# Patient Record
Sex: MALE | Race: OTHER | Employment: OTHER | URBAN - METROPOLITAN AREA
[De-identification: names, ages, dates, MRNs, and addresses within clinical notes are randomized per-mention and may not be internally consistent; named-entity substitution may affect disease eponyms.]

---

## 2019-08-20 NOTE — PATIENT DISCUSSION
INCREASED AT 4 MONTHS - BUT [facility] 20/30 - PT WANTS TO WAIT 3 MORE MONTHS - OS IS HER GOOD EYE - IMAGES ARE DISTORTED OD - THEREFORE TO WAIT.

## 2019-10-29 NOTE — PATIENT DISCUSSION
FINA 5 7 19 - INCREASING VMT WITHOUT SIG ERM AND [facility]

## 2019-12-12 NOTE — PATIENT DISCUSSION
Recommend CATARACT SURGERY PRIOR to MACULAR HOLE repair, PREFERABLY with ONCE PIECE ACRYLIC LENS. Patient understands it is likely a vitrectomy will be needed after the cataract surgery to further improve their vision. They understand the cataract surgery will improve the view of the MACULAR HOLE during the surgery, and will improve the view and ability to treat MACULAR HOLE and any retinal problems after the surgery. Pt instructed to RETURN 3 TO 4 WEEKS AFTER CATARACT SURGERY for reevaluation.

## 2020-03-05 NOTE — PATIENT DISCUSSION
FINA 5 7 19 - INCREASING VMT WITHOUT SIG ERM AND [facility]UCTION [facility]

## 2020-06-27 NOTE — PATIENT DISCUSSION
FINA 5 7 19 - INCREASING VMT WITHOUT SIG ERM [facility], unchanged

## 2021-07-28 NOTE — PATIENT DISCUSSION
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

## 2021-11-03 ENCOUNTER — CATARACT EVALUATION (OUTPATIENT)
Dept: URBAN - METROPOLITAN AREA CLINIC 39 | Facility: CLINIC | Age: 56
End: 2021-11-03

## 2021-11-03 DIAGNOSIS — H25.811: ICD-10-CM

## 2021-11-03 DIAGNOSIS — Z98.890: ICD-10-CM

## 2021-11-03 DIAGNOSIS — H04.123: ICD-10-CM

## 2021-11-03 DIAGNOSIS — H25.812: ICD-10-CM

## 2021-11-03 DIAGNOSIS — H43.812: ICD-10-CM

## 2021-11-03 PROCEDURE — 92015 DETERMINE REFRACTIVE STATE: CPT

## 2021-11-03 PROCEDURE — 92286 ANT SGM IMG I&R SPECLR MIC: CPT

## 2021-11-03 PROCEDURE — 99204 OFFICE O/P NEW MOD 45 MIN: CPT

## 2021-11-03 PROCEDURE — 92250 FUNDUS PHOTOGRAPHY W/I&R: CPT

## 2021-11-03 PROCEDURE — 92025AV CORNEAL TOPOGRAPHY, AV

## 2021-11-03 PROCEDURE — 92134 CPTRZ OPH DX IMG PST SGM RTA: CPT

## 2021-11-03 PROCEDURE — 92136TC INTERFEROMETRY - TECHNICAL COMPONENT

## 2021-11-03 PROCEDURE — V2799PMN IMPRIMIS PRED-MOXI-NEPAF 5ML

## 2021-11-03 RX ORDER — AMOXICILLIN 500 MG/1: 1 CAPSULE ORAL

## 2021-11-03 ASSESSMENT — TONOMETRY
OD_IOP_MMHG: 13
OS_IOP_MMHG: 12

## 2021-11-03 ASSESSMENT — VISUAL ACUITY
OD_SC: J3-
OS_SC: J1
OD_CC: 20/25+2
OD_SC: 20/30-2
OS_BAT: 20/50
OS_CC: 20/20
OS_SC: 20/40
OD_BAT: 20/60

## 2021-11-08 NOTE — PATIENT DISCUSSION
HUIA 5 7 19 - INCREASING VMT WITHOUT SIG ERM AND REDUCTION IN [facility].

## 2021-11-11 ENCOUNTER — ESTABLISHED PATIENT (OUTPATIENT)
Dept: URBAN - METROPOLITAN AREA SURGERY 14 | Facility: SURGERY | Age: 56
End: 2021-11-11

## 2021-11-11 ENCOUNTER — SURGERY/PROCEDURE (OUTPATIENT)
Dept: URBAN - METROPOLITAN AREA CLINIC 39 | Facility: CLINIC | Age: 56
End: 2021-11-11

## 2021-11-11 DIAGNOSIS — H25.812: ICD-10-CM

## 2021-11-11 DIAGNOSIS — Z98.890: ICD-10-CM

## 2021-11-11 DIAGNOSIS — H43.812: ICD-10-CM

## 2021-11-11 DIAGNOSIS — H04.123: ICD-10-CM

## 2021-11-11 DIAGNOSIS — H25.811: ICD-10-CM

## 2021-11-11 PROCEDURE — 99211HP H&P OFFICE/OUTPATIENT VISIT, EST

## 2021-11-11 PROCEDURE — 66984AV REMOVE CATARACT, INSERT ADVANCED LENS

## 2021-11-11 PROCEDURE — 66999LNSR LENSAR LASER FOR CAT SX

## 2021-11-12 ENCOUNTER — CATARACT POST-OP 1-DAY (OUTPATIENT)
Dept: URBAN - METROPOLITAN AREA CLINIC 39 | Facility: CLINIC | Age: 56
End: 2021-11-12

## 2021-11-12 DIAGNOSIS — Z96.1: ICD-10-CM

## 2021-11-12 PROCEDURE — 99024 POSTOP FOLLOW-UP VISIT: CPT

## 2021-11-12 ASSESSMENT — VISUAL ACUITY
OS_SC: 20/40+1
OS_SC: J1

## 2021-11-12 ASSESSMENT — TONOMETRY
OD_IOP_MMHG: 15
OS_IOP_MMHG: 16

## 2021-11-16 ENCOUNTER — POST OP/EVAL OF SECOND EYE (OUTPATIENT)
Dept: URBAN - METROPOLITAN AREA CLINIC 39 | Facility: CLINIC | Age: 56
End: 2021-11-16

## 2021-11-16 DIAGNOSIS — Z96.1: ICD-10-CM

## 2021-11-16 DIAGNOSIS — H25.811: ICD-10-CM

## 2021-11-16 PROCEDURE — 99024 POSTOP FOLLOW-UP VISIT: CPT

## 2021-11-16 PROCEDURE — 92012 INTRM OPH EXAM EST PATIENT: CPT

## 2021-11-16 ASSESSMENT — TONOMETRY
OD_IOP_MMHG: 15
OS_IOP_MMHG: 15

## 2021-11-16 ASSESSMENT — VISUAL ACUITY
OD_BAT: 20/60
OS_SC: 20/30-2
OS_SC: J1

## 2021-11-18 ENCOUNTER — ESTABLISHED PATIENT (OUTPATIENT)
Dept: URBAN - METROPOLITAN AREA SURGERY 14 | Facility: SURGERY | Age: 56
End: 2021-11-18

## 2021-11-18 ENCOUNTER — FOLLOW UP (OUTPATIENT)
Dept: URBAN - METROPOLITAN AREA CLINIC 39 | Facility: CLINIC | Age: 56
End: 2021-11-18

## 2021-11-18 ENCOUNTER — SURGERY/PROCEDURE (OUTPATIENT)
Dept: URBAN - METROPOLITAN AREA CLINIC 39 | Facility: CLINIC | Age: 56
End: 2021-11-18

## 2021-11-18 DIAGNOSIS — H25.811: ICD-10-CM

## 2021-11-18 DIAGNOSIS — Z96.1: ICD-10-CM

## 2021-11-18 PROCEDURE — 66999LNSR LENSAR LASER FOR CAT SX

## 2021-11-18 PROCEDURE — 99211T TECH SERVICE

## 2021-11-18 PROCEDURE — 66984AV REMOVE CATARACT, INSERT ADVANCED LENS

## 2021-11-18 PROCEDURE — 99211HP H&P OFFICE/OUTPATIENT VISIT, EST

## 2021-11-18 ASSESSMENT — VISUAL ACUITY
OD_SC: 20/40
OS_SC: J1
OS_SC: 20/40-1

## 2021-11-19 ENCOUNTER — CATARACT POST-OP 1-DAY (OUTPATIENT)
Dept: URBAN - METROPOLITAN AREA CLINIC 39 | Facility: CLINIC | Age: 56
End: 2021-11-19

## 2021-11-19 DIAGNOSIS — Z96.1: ICD-10-CM

## 2021-11-19 PROCEDURE — 99024 POSTOP FOLLOW-UP VISIT: CPT

## 2021-11-19 ASSESSMENT — VISUAL ACUITY
OS_PH: 20/20-2
OS_SC: 20/30-1
OD_SC: J1
OS_SC: J1+
OD_SC: 20/20-2

## 2021-11-19 ASSESSMENT — TONOMETRY
OD_IOP_MMHG: 19
OS_IOP_MMHG: 19

## 2021-12-17 ENCOUNTER — POST-OP (OUTPATIENT)
Dept: URBAN - METROPOLITAN AREA CLINIC 39 | Facility: CLINIC | Age: 56
End: 2021-12-17

## 2021-12-17 DIAGNOSIS — Z96.1: ICD-10-CM

## 2021-12-17 PROCEDURE — 92310AV CL MGMNT ADVANCED VISION TRIAL ALL INCL

## 2021-12-17 PROCEDURE — 99024 POSTOP FOLLOW-UP VISIT: CPT

## 2021-12-17 ASSESSMENT — VISUAL ACUITY
OS_PH: 20/25
OS_SC: J1+
OD_SC: 20/20
OS_SC: 20/40
OD_SC: J1+

## 2021-12-17 ASSESSMENT — TONOMETRY
OS_IOP_MMHG: 12
OD_IOP_MMHG: 12

## 2022-01-14 ENCOUNTER — POST-OP (OUTPATIENT)
Dept: URBAN - METROPOLITAN AREA CLINIC 39 | Facility: CLINIC | Age: 57
End: 2022-01-14

## 2022-01-14 DIAGNOSIS — Z96.1: ICD-10-CM

## 2022-01-14 PROCEDURE — 99024 POSTOP FOLLOW-UP VISIT: CPT

## 2022-01-14 ASSESSMENT — VISUAL ACUITY
OS_BAT: 20/40 W/MR
OS_SC: J1+
OS_SC: 20/40+2
OD_SC: J1+
OS_PH: 20/20-1
OD_BAT: 20/40 W/MR
OD_SC: 20/20

## 2022-01-14 ASSESSMENT — TONOMETRY
OS_IOP_MMHG: 12
OD_IOP_MMHG: 12

## 2022-01-17 ENCOUNTER — SURGERY/PROCEDURE (OUTPATIENT)
Dept: URBAN - METROPOLITAN AREA SURGERY 14 | Facility: SURGERY | Age: 57
End: 2022-01-17

## 2022-01-17 ENCOUNTER — CONSULTATION/EVALUATION (OUTPATIENT)
Dept: URBAN - METROPOLITAN AREA CLINIC 39 | Facility: CLINIC | Age: 57
End: 2022-01-17

## 2022-01-17 DIAGNOSIS — H26.492: ICD-10-CM

## 2022-01-17 DIAGNOSIS — Z96.1: ICD-10-CM

## 2022-01-17 PROCEDURE — 92014 COMPRE OPH EXAM EST PT 1/>: CPT

## 2022-01-17 PROCEDURE — 66821 AFTER CATARACT LASER SURGERY: CPT

## 2022-01-20 ASSESSMENT — TONOMETRY
OD_IOP_MMHG: 11
OS_IOP_MMHG: 12

## 2022-01-20 ASSESSMENT — VISUAL ACUITY
OD_SC: 20/15
OS_SC: 20/30-1

## 2022-02-02 ENCOUNTER — POST-OP (OUTPATIENT)
Dept: URBAN - METROPOLITAN AREA CLINIC 39 | Facility: CLINIC | Age: 57
End: 2022-02-02

## 2022-02-02 DIAGNOSIS — Z98.890: ICD-10-CM

## 2022-02-02 PROCEDURE — 99024 POSTOP FOLLOW-UP VISIT: CPT

## 2022-02-02 ASSESSMENT — VISUAL ACUITY
OU_SC: J1+
OS_SC: J1+
OD_SC: 20/20
OU_SC: 20/20
OS_SC: 20/30-2
OD_SC: J1+

## 2022-02-02 ASSESSMENT — TONOMETRY
OS_IOP_MMHG: 12
OD_IOP_MMHG: 12

## 2022-04-05 ENCOUNTER — TECH ONLY (OUTPATIENT)
Dept: URBAN - METROPOLITAN AREA CLINIC 39 | Facility: CLINIC | Age: 57
End: 2022-04-05

## 2022-04-05 DIAGNOSIS — Z96.1: ICD-10-CM

## 2022-04-05 DIAGNOSIS — Z98.890: ICD-10-CM

## 2022-04-05 PROCEDURE — 99211T TECH SERVICE

## 2022-04-05 ASSESSMENT — VISUAL ACUITY: OS_SC: 20/30-2

## 2022-04-07 ENCOUNTER — PRE-OP/H&P (OUTPATIENT)
Dept: URBAN - METROPOLITAN AREA CLINIC 39 | Facility: CLINIC | Age: 57
End: 2022-04-07

## 2022-04-07 ENCOUNTER — SURGERY/PROCEDURE (OUTPATIENT)
Dept: URBAN - METROPOLITAN AREA CLINIC 39 | Facility: CLINIC | Age: 57
End: 2022-04-07

## 2022-04-07 DIAGNOSIS — Z98.890: ICD-10-CM

## 2022-04-07 PROCEDURE — 99211HP H&P OFFICE/OUTPATIENT VISIT, EST

## 2022-04-07 PROCEDURE — 66999SPP EPI LASEK S/P ADVANCED / CUSTOM < 12 MONTHS

## 2022-04-08 ENCOUNTER — POST-OP (OUTPATIENT)
Dept: URBAN - METROPOLITAN AREA CLINIC 39 | Facility: CLINIC | Age: 57
End: 2022-04-08

## 2022-04-08 DIAGNOSIS — Z98.890: ICD-10-CM

## 2022-04-08 PROCEDURE — 99024 POSTOP FOLLOW-UP VISIT: CPT

## 2022-04-08 ASSESSMENT — VISUAL ACUITY
OD_SC: 20/20
OD_SC: J1+
OS_SC: J8
OS_SC: 20/30-2

## 2022-04-14 ENCOUNTER — POST-OP (OUTPATIENT)
Dept: URBAN - METROPOLITAN AREA CLINIC 39 | Facility: CLINIC | Age: 57
End: 2022-04-14

## 2022-04-14 DIAGNOSIS — Z98.890: ICD-10-CM

## 2022-04-14 PROCEDURE — 99024 POSTOP FOLLOW-UP VISIT: CPT

## 2022-04-14 ASSESSMENT — VISUAL ACUITY
OU_SC: 20/20
OD_SC: J1
OS_SC: 20/80
OS_SC: J2
OU_SC: J1
OD_SC: 20/20
OS_PH: 20/25+2

## 2022-05-26 ENCOUNTER — POST-OP (OUTPATIENT)
Dept: URBAN - METROPOLITAN AREA CLINIC 39 | Facility: CLINIC | Age: 57
End: 2022-05-26

## 2022-05-26 DIAGNOSIS — Z98.890: ICD-10-CM

## 2022-05-26 PROCEDURE — 99024 POSTOP FOLLOW-UP VISIT: CPT

## 2022-05-26 ASSESSMENT — VISUAL ACUITY
OS_SC: 20/20
OD_SC: J1+
OD_SC: 20/20
OS_SC: J1+

## 2022-05-26 ASSESSMENT — TONOMETRY
OD_IOP_MMHG: 13
OS_IOP_MMHG: 14

## 2023-04-05 NOTE — PATIENT DISCUSSION
We reviewed the SIGNS and SYMPTOMS of RETINAL TEAR/RETINAL DETACHMENT and the importance of prompt evaluation should there be increasing floaters, new flashing lights, or decreasing peripheral vision in either eye at any time. 36.6

## 2023-05-26 ENCOUNTER — COMPREHENSIVE EXAM (OUTPATIENT)
Dept: URBAN - METROPOLITAN AREA CLINIC 39 | Facility: CLINIC | Age: 58
End: 2023-05-26

## 2023-05-26 DIAGNOSIS — H52.12: ICD-10-CM

## 2023-05-26 DIAGNOSIS — H43.812: ICD-10-CM

## 2023-05-26 DIAGNOSIS — H52.01: ICD-10-CM

## 2023-05-26 DIAGNOSIS — H52.203: ICD-10-CM

## 2023-05-26 DIAGNOSIS — H26.491: ICD-10-CM

## 2023-05-26 DIAGNOSIS — H04.123: ICD-10-CM

## 2023-05-26 PROCEDURE — 92014 COMPRE OPH EXAM EST PT 1/>: CPT

## 2023-05-26 PROCEDURE — 92015 DETERMINE REFRACTIVE STATE: CPT

## 2023-05-26 ASSESSMENT — VISUAL ACUITY
OS_SC: 20/20-1
OU_SC: J1+
OD_SC: 20/20
OS_SC: J1+
OD_SC: J1+
OU_SC: 20/15

## 2023-05-26 ASSESSMENT — TONOMETRY
OD_IOP_MMHG: 14
OS_IOP_MMHG: 13

## 2024-04-10 NOTE — PATIENT DISCUSSION
Cele Burrows is a 71 y.o. male complains of   Chief Complaint   Patient presents with    Diabetes    malignant neoplasm of urinary bladder    Hip Pain     Bilateral hip pain    Sinus Problem     Had CT scan from Dr. Snowden       HPI: Here for follow-up.  History of diabetes, hypertension, osteoporosis, personal history of bladder cancer, BPH, history of prostate biopsy which was benign.  Fell while working in the garden and since then having a left hip pain more than right.  Able to ambulate without support.  Aching most of the time and especially lying on her left side.  Radiating to the upper thigh.  Present most of the time.  Mild to moderate in intensity.  Recent repeat bone density test showed osteoporosis.  He is seen by hematology but was referred to PCP for further treatment.  At this time discussed that he has personal history of bladder cancer will consider rheumatology referral to obtain further recommendation regarding treatment.  Meantime he is working with the dentist regarding his cavity.  No urinary complaint at this time.  Vitals been stable.  Taking medication with compliance.  Fluctuating blood sugar.  A1c improved compared to before but still elevated.  Greater than 7.  No hypo or hyperglycemic symptoms.  Had issue with noncompliance with follow-up and instructions  CMP:  Lab Results   Component Value Date/Time     08/24/2023 09:13 AM    K 4.7 08/24/2023 09:13 AM     08/24/2023 09:13 AM    CO2 31 08/24/2023 09:13 AM    BUN 14 08/24/2023 09:13 AM    CREATININE 0.80 08/24/2023 09:13 AM    GLUCOSE 123 08/24/2023 09:13 AM    CALCIUM 9.2 08/24/2023 09:13 AM    PROT 7.0 08/24/2023 09:13 AM    LABALBU 3.7 08/24/2023 09:13 AM    BILITOT 0.6 08/24/2023 09:13 AM    AST 15 08/24/2023 09:13 AM    ALT 24 08/24/2023 09:13 AM        POC Glucose:   Lab Results   Component Value Date/Time    POCGLU 159 12/02/2021 02:24 PM        CBC:  Lab Results   Component Value Date/Time    WBC 7.4  The patient is at risk for a choroidal neovascular membrane. NO CNV SEEN TODAY. Dry ARMD is responsible for some decrease in vision.

## 2024-08-16 ENCOUNTER — COMPREHENSIVE EXAM (OUTPATIENT)
Dept: URBAN - METROPOLITAN AREA CLINIC 39 | Facility: CLINIC | Age: 59
End: 2024-08-16

## 2024-08-16 DIAGNOSIS — H52.203: ICD-10-CM

## 2024-08-16 DIAGNOSIS — H04.123: ICD-10-CM

## 2024-08-16 DIAGNOSIS — H26.491: ICD-10-CM

## 2024-08-16 DIAGNOSIS — H52.01: ICD-10-CM

## 2024-08-16 DIAGNOSIS — H43.812: ICD-10-CM

## 2024-08-16 DIAGNOSIS — H52.12: ICD-10-CM

## 2024-08-16 PROCEDURE — 92014 COMPRE OPH EXAM EST PT 1/>: CPT

## 2024-08-16 PROCEDURE — 92015 DETERMINE REFRACTIVE STATE: CPT

## 2024-08-16 ASSESSMENT — VISUAL ACUITY
OD_SC: 20/15
OD_SC: J1
OS_SC: 20/15
OS_SC: J1

## 2024-08-16 ASSESSMENT — TONOMETRY
OS_IOP_MMHG: 16
OD_IOP_MMHG: 16

## 2025-08-22 ENCOUNTER — COMPREHENSIVE EXAM (OUTPATIENT)
Age: 60
End: 2025-08-22

## 2025-08-22 DIAGNOSIS — H26.491: ICD-10-CM

## 2025-08-22 DIAGNOSIS — H04.123: ICD-10-CM

## 2025-08-22 DIAGNOSIS — H43.812: ICD-10-CM

## 2025-08-22 DIAGNOSIS — H52.203: ICD-10-CM

## 2025-08-22 DIAGNOSIS — H04.121: ICD-10-CM

## 2025-08-22 PROCEDURE — 92014 COMPRE OPH EXAM EST PT 1/>: CPT

## 2025-08-22 PROCEDURE — 92250E RETINAL SCREENING, ELECTIVE

## 2025-08-22 PROCEDURE — 92015 DETERMINE REFRACTIVE STATE: CPT
